# Patient Record
Sex: MALE | Race: WHITE | NOT HISPANIC OR LATINO | URBAN - METROPOLITAN AREA
[De-identification: names, ages, dates, MRNs, and addresses within clinical notes are randomized per-mention and may not be internally consistent; named-entity substitution may affect disease eponyms.]

---

## 2021-04-06 ENCOUNTER — OFFICE VISIT (OUTPATIENT)
Dept: URBAN - METROPOLITAN AREA CLINIC 80 | Facility: CLINIC | Age: 86
End: 2021-04-06
Payer: COMMERCIAL

## 2021-04-06 DIAGNOSIS — K58.1 IRRITABLE BOWEL SYNDROME WITH CONSTIPATION: ICD-10-CM

## 2021-04-06 PROCEDURE — 99213 OFFICE O/P EST LOW 20 MIN: CPT | Performed by: NURSE PRACTITIONER

## 2021-04-06 RX ORDER — LISINOPRIL 10 MG/1
TABLET ORAL
Qty: 0 | Refills: 0 | Status: ACTIVE | COMMUNITY
Start: 1900-01-01

## 2021-04-06 NOTE — HPI-TODAY'S VISIT:
Very pleaant 87 yr old male here for followup with constipation. He had previously used linzess 290mcg which worked well for a while but it was cost prohibitive. Recently he is taking miralax and prune juice which also works but he does not like taking it.  He would like to trial alternative medication. His weightg is stable. appetite is good. no straining with bowel movements.

## 2021-04-12 ENCOUNTER — TELEPHONE ENCOUNTER (OUTPATIENT)
Dept: URBAN - METROPOLITAN AREA CLINIC 80 | Facility: CLINIC | Age: 86
End: 2021-04-12

## 2021-06-04 ENCOUNTER — OFFICE VISIT (OUTPATIENT)
Dept: URBAN - METROPOLITAN AREA CLINIC 126 | Facility: CLINIC | Age: 86
End: 2021-06-04
Payer: COMMERCIAL

## 2021-06-04 DIAGNOSIS — K58.1 IRRITABLE BOWEL SYNDROME WITH CONSTIPATION: ICD-10-CM

## 2021-06-04 PROCEDURE — 99212 OFFICE O/P EST SF 10 MIN: CPT | Performed by: INTERNAL MEDICINE

## 2021-06-04 RX ORDER — LISINOPRIL 10 MG/1
TABLET ORAL
Qty: 0 | Refills: 0 | Status: ACTIVE | COMMUNITY
Start: 1900-01-01

## 2021-06-09 ENCOUNTER — TELEPHONE ENCOUNTER (OUTPATIENT)
Dept: URBAN - METROPOLITAN AREA CLINIC 2 | Facility: CLINIC | Age: 86
End: 2021-06-09

## 2021-06-09 RX ORDER — LISINOPRIL 10 MG/1
TABLET ORAL
Qty: 0 | Refills: 0 | Status: ACTIVE | COMMUNITY
Start: 1900-01-01

## 2021-06-09 RX ORDER — LACTULOSE 10 G/15ML
15 ML SOLUTION ORAL TWICE DAILY
Qty: 900 MILLILITER | Refills: 1 | OUTPATIENT
Start: 2021-06-09 | End: 2021-08-08

## 2021-07-06 ENCOUNTER — TELEPHONE ENCOUNTER (OUTPATIENT)
Dept: URBAN - METROPOLITAN AREA CLINIC 2 | Facility: CLINIC | Age: 86
End: 2021-07-06

## 2021-07-12 ENCOUNTER — TELEPHONE ENCOUNTER (OUTPATIENT)
Dept: URBAN - METROPOLITAN AREA CLINIC 2 | Facility: CLINIC | Age: 86
End: 2021-07-12

## 2021-07-22 ENCOUNTER — TELEPHONE ENCOUNTER (OUTPATIENT)
Dept: URBAN - METROPOLITAN AREA CLINIC 2 | Facility: CLINIC | Age: 86
End: 2021-07-22

## 2021-08-19 ENCOUNTER — TELEPHONE ENCOUNTER (OUTPATIENT)
Dept: URBAN - METROPOLITAN AREA CLINIC 2 | Facility: CLINIC | Age: 86
End: 2021-08-19

## 2022-01-10 ENCOUNTER — OFFICE VISIT (OUTPATIENT)
Dept: URBAN - METROPOLITAN AREA CLINIC 2 | Facility: CLINIC | Age: 87
End: 2022-01-10
Payer: COMMERCIAL

## 2022-01-10 DIAGNOSIS — K58.1 IRRITABLE BOWEL SYNDROME WITH CONSTIPATION: ICD-10-CM

## 2022-01-10 PROCEDURE — 99214 OFFICE O/P EST MOD 30 MIN: CPT | Performed by: NURSE PRACTITIONER

## 2022-01-10 RX ORDER — SENNOSIDES 8.6 MG/1
2 TABLETS AT BEDTIME AS NEEDED. HOLD FOR DIARRHEA TABLET, FILM COATED ORAL ONCE A DAY
Qty: 60 TABLET | Refills: 1 | OUTPATIENT
Start: 2022-01-10 | End: 2022-03-11

## 2022-01-10 RX ORDER — LISINOPRIL 10 MG/1
TABLET ORAL
Qty: 0 | Refills: 0 | Status: ACTIVE | COMMUNITY
Start: 1900-01-01

## 2022-01-10 NOTE — HPI-TODAY'S VISIT:
Very pleasant 88-year-old male with chronic constipation seen today in follow-up.  Linzess has worked but has been cost prohibitive. Linzess also eventually lost effectiveness. Trulance did not work.  Twice daily MiraLAX did not work.  Motegrity made him feel bad.  Since his last visit he has continued to use prune juice and Metamucil which helps.  We did send prescription for lactulose, but that did not work either. He went to urgent care recently for worsening constipation. He was started on colace which has helped. He had good BM this morning.

## 2022-01-11 ENCOUNTER — TELEPHONE ENCOUNTER (OUTPATIENT)
Dept: URBAN - METROPOLITAN AREA CLINIC 92 | Facility: CLINIC | Age: 87
End: 2022-01-11

## 2022-04-07 ENCOUNTER — OFFICE VISIT (OUTPATIENT)
Dept: URBAN - METROPOLITAN AREA CLINIC 2 | Facility: CLINIC | Age: 87
End: 2022-04-07
Payer: COMMERCIAL

## 2022-04-07 ENCOUNTER — DASHBOARD ENCOUNTERS (OUTPATIENT)
Age: 87
End: 2022-04-07

## 2022-04-07 DIAGNOSIS — K58.1 IRRITABLE BOWEL SYNDROME WITH CONSTIPATION: ICD-10-CM

## 2022-04-07 DIAGNOSIS — K56.600 PARTIAL SMALL BOWEL OBSTRUCTION: ICD-10-CM

## 2022-04-07 PROBLEM — 440630006: Status: ACTIVE | Noted: 2021-04-06

## 2022-04-07 PROCEDURE — 99213 OFFICE O/P EST LOW 20 MIN: CPT | Performed by: NURSE PRACTITIONER

## 2022-04-07 RX ORDER — LISINOPRIL 10 MG/1
TABLET ORAL
Qty: 0 | Refills: 0 | Status: ACTIVE | COMMUNITY
Start: 1900-01-01

## 2022-04-07 NOTE — HPI-TODAY'S VISIT:
Very pleasant 88-year-old male seen after hospitalization for partial small bowel obstruction.  He improved with conservative management.  He did have a large bowel movement prior to discharge and his abdominal pain resolved and he tolerated diet.  Since hospital discharge he is using prune juice for constipation.  He is not using senna now as he was having diarrhea. He denies abdominal pain he is tolerating diet well and he feels well.  He plans for knee surgery later this year and a trip to [place] next year. remote hx of appendectomy as child

## 2022-04-27 ENCOUNTER — TELEPHONE ENCOUNTER (OUTPATIENT)
Dept: URBAN - METROPOLITAN AREA CLINIC 92 | Facility: CLINIC | Age: 87
End: 2022-04-27

## 2022-11-22 NOTE — PHYSICAL EXAM CHEST:
Disp Refills Start End    doxycycline hyclate (VIBRAMYCIN) 100 MG capsule 14 capsule 0 11/12/2022 11/19/2022    Sig - Route: Take 1 capsule by mouth in the morning and 1 capsule in the evening. Do all this for 7 days. - Oral    Sent to pharmacy as: Doxycycline Hyclate 100 MG Oral Capsule (VIBRAMYCIN)    Class: Eprescribe    E-Prescribing Status: Receipt confirmed by pharmacy (11/12/2022 10:17 AM CST)      LOV 1025/2021  PHARMACY VERIFIED    no lesions,  no deformities,  no traumatic injuries,  no significant scars are present,  chest wall non-tender,  no masses present, breathing is unlabored without accessory muscle use,normal breath sounds